# Patient Record
Sex: MALE | Race: BLACK OR AFRICAN AMERICAN | URBAN - METROPOLITAN AREA
[De-identification: names, ages, dates, MRNs, and addresses within clinical notes are randomized per-mention and may not be internally consistent; named-entity substitution may affect disease eponyms.]

---

## 2018-10-10 ENCOUNTER — HISTORICAL (OUTPATIENT)
Dept: INTERNAL MEDICINE | Facility: CLINIC | Age: 23
End: 2018-10-10

## 2018-10-10 LAB
ABS NEUT (OLG): 7.89 X10(3)/MCL (ref 2.1–9.2)
ALBUMIN SERPL-MCNC: 3.6 GM/DL (ref 3.4–5)
ALBUMIN/GLOB SERPL: 1 RATIO (ref 1–2)
ALP SERPL-CCNC: 69 UNIT/L (ref 45–117)
ALT SERPL-CCNC: 40 UNIT/L (ref 12–78)
AMYLASE SERPL-CCNC: 69 UNIT/L (ref 25–115)
APPEARANCE, UA: CLEAR
AST SERPL-CCNC: 23 UNIT/L (ref 15–37)
BACTERIA #/AREA URNS AUTO: ABNORMAL /[HPF]
BASOPHILS # BLD AUTO: 0.09 X10(3)/MCL
BASOPHILS NFR BLD AUTO: 1 %
BASOPHILS NFR BLD MANUAL: 0 %
BILIRUB SERPL-MCNC: 0.7 MG/DL (ref 0.2–1)
BILIRUB UR QL STRIP: NEGATIVE
BILIRUBIN DIRECT+TOT PNL SERPL-MCNC: 0.2 MG/DL
BILIRUBIN DIRECT+TOT PNL SERPL-MCNC: 0.5 MG/DL
BUN SERPL-MCNC: 12 MG/DL (ref 7–18)
CALCIUM SERPL-MCNC: 7.9 MG/DL (ref 8.5–10.1)
CHLORIDE SERPL-SCNC: 108 MMOL/L (ref 98–107)
CO2 SERPL-SCNC: 30 MMOL/L (ref 21–32)
COLOR UR: NORMAL
CREAT SERPL-MCNC: 1 MG/DL (ref 0.6–1.3)
D DIMER PPP IA.FEU-MCNC: 0.41 MCG/ML FEU
EOSINOPHIL # BLD AUTO: 1.36 X10(3)/MCL
EOSINOPHIL NFR BLD AUTO: 11 %
EOSINOPHIL NFR BLD MANUAL: 0 %
ERYTHROCYTE [DISTWIDTH] IN BLOOD BY AUTOMATED COUNT: 13 % (ref 11.5–14.5)
ERYTHROCYTE [DISTWIDTH] IN BLOOD BY AUTOMATED COUNT: 13.2 % (ref 11.5–14.5)
FLUAV AG NPH QL IA: NEGATIVE
FLUBV AG NPH QL IA: NEGATIVE
GLOBULIN SER-MCNC: 3.8 GM/ML (ref 2.3–3.5)
GLUCOSE (UA): NORMAL
GLUCOSE SERPL-MCNC: 83 MG/DL (ref 74–106)
GRANULOCYTES NFR BLD MANUAL: 86 % (ref 43–75)
HCO3 UR-SCNC: 19.8 MMOL/L (ref 22–26)
HCT VFR BLD AUTO: 42.2 % (ref 40–51)
HCT VFR BLD AUTO: 48.8 % (ref 40–51)
HGB BLD-MCNC: 13.9 GM/DL (ref 13.5–17.5)
HGB BLD-MCNC: 16.3 GM/DL (ref 13.5–17.5)
HGB UR QL STRIP: NEGATIVE
HYALINE CASTS #/AREA URNS LPF: ABNORMAL /[LPF]
IMM GRANULOCYTES # BLD AUTO: 0.03 10*3/UL
IMM GRANULOCYTES NFR BLD AUTO: 0 %
KETONES UR QL STRIP: NEGATIVE
LEUKOCYTE ESTERASE UR QL STRIP: NEGATIVE
LIPASE SERPL-CCNC: 92 UNIT/L (ref 73–393)
LYMPHOCYTES # BLD AUTO: 1.73 X10(3)/MCL
LYMPHOCYTES NFR BLD AUTO: 14 % (ref 13–40)
LYMPHOCYTES NFR BLD MANUAL: 6 % (ref 20.5–51.1)
MAGNESIUM SERPL-MCNC: 1.8 MG/DL (ref 1.8–2.4)
MCH RBC QN AUTO: 32.3 PG (ref 26–34)
MCH RBC QN AUTO: 32.5 PG (ref 26–34)
MCHC RBC AUTO-ENTMCNC: 32.9 GM/DL (ref 31–37)
MCHC RBC AUTO-ENTMCNC: 33.4 GM/DL (ref 31–37)
MCV RBC AUTO: 97.2 FL (ref 80–100)
MCV RBC AUTO: 98.1 FL (ref 80–100)
MONOCYTES # BLD AUTO: 1.15 X10(3)/MCL
MONOCYTES NFR BLD AUTO: 9 % (ref 4–12)
MONOCYTES NFR BLD MANUAL: 0 % (ref 2–9)
NEUTROPHILS # BLD AUTO: 7.89 X10(3)/MCL
NEUTROPHILS NFR BLD AUTO: 64 X10(3)/MCL
NEUTS BAND NFR BLD MANUAL: 8 % (ref 0–10)
NITRITE UR QL STRIP: NEGATIVE
O2 HGB ARTERIAL: 92.2 % (ref 94–100)
PCO2 BLDA: 35 MMHG (ref 35–45)
PH SMN: 7.36 [PH] (ref 7.35–7.45)
PH UR STRIP: 6.5 [PH] (ref 4.5–8)
PLATELET # BLD AUTO: 146 X10(3)/MCL (ref 130–400)
PLATELET # BLD AUTO: 158 X10(3)/MCL (ref 130–400)
PLATELET # BLD EST: ADEQUATE 10*3/UL
PMV BLD AUTO: 11.8 FL (ref 7.4–10.4)
PMV BLD AUTO: 12.2 FL (ref 7.4–10.4)
PO2 BLDA: 67 MMHG (ref 75–100)
POC ALLENS TEST: POSITIVE
POC BE: -4.9 (ref -2–2)
POC CO HGB: 2.2 % (ref 0.5–1.5)
POC CO2: 20.9 MMOL/L (ref 22–27)
POC MET HGB: 1.2 % (ref 0–1.5)
POC SAMPLESOURCE: ABNORMAL
POC SATURATED O2: 95.5 % (ref 95–98)
POC SITE: ABNORMAL
POC THB: 14.2 GM/DL (ref 12–18)
POC TREATMENT: ABNORMAL
POTASSIUM SERPL-SCNC: 2.9 MMOL/L (ref 3.5–5.1)
POTASSIUM SERPL-SCNC: 4 MMOL/L (ref 3.5–5.1)
PROT SERPL-MCNC: 7.4 GM/DL (ref 6.4–8.2)
PROT UR QL STRIP: NEGATIVE
RBC # BLD AUTO: 4.3 X10(6)/MCL (ref 4.5–5.9)
RBC # BLD AUTO: 5.02 X10(6)/MCL (ref 4.5–5.9)
RBC #/AREA URNS AUTO: ABNORMAL /[HPF]
RBC MORPH BLD: NORMAL
SODIUM SERPL-SCNC: 144 MMOL/L (ref 136–145)
SP GR UR STRIP: 1.01 (ref 1–1.03)
SQUAMOUS #/AREA URNS LPF: <1 /[LPF]
UROBILINOGEN UR STRIP-ACNC: NORMAL
WBC # SPEC AUTO: 12.2 X10(3)/MCL (ref 4.5–11)
WBC # SPEC AUTO: 14.9 X10(3)/MCL (ref 4.5–11)
WBC #/AREA URNS AUTO: ABNORMAL /HPF

## 2018-10-11 LAB
ABS NEUT (OLG): 14.58 X10(3)/MCL (ref 2.1–9.2)
BASOPHILS # BLD AUTO: 0.03 X10(3)/MCL
BASOPHILS NFR BLD AUTO: 0 %
BUN SERPL-MCNC: 10 MG/DL (ref 7–18)
CALCIUM SERPL-MCNC: 8.5 MG/DL (ref 8.5–10.1)
CHLORIDE SERPL-SCNC: 110 MMOL/L (ref 98–107)
CO2 SERPL-SCNC: 25 MMOL/L (ref 21–32)
CREAT SERPL-MCNC: 0.9 MG/DL (ref 0.6–1.3)
CREAT/UREA NIT SERPL: 11
EOSINOPHIL # BLD AUTO: 0.01 X10(3)/MCL
EOSINOPHIL NFR BLD AUTO: 0 %
ERYTHROCYTE [DISTWIDTH] IN BLOOD BY AUTOMATED COUNT: 13.4 % (ref 11.5–14.5)
GLUCOSE SERPL-MCNC: 105 MG/DL (ref 74–106)
HCT VFR BLD AUTO: 42.9 % (ref 40–51)
HGB BLD-MCNC: 13.9 GM/DL (ref 13.5–17.5)
IMM GRANULOCYTES # BLD AUTO: 0.15 10*3/UL
IMM GRANULOCYTES NFR BLD AUTO: 1 %
LYMPHOCYTES # BLD AUTO: 1.2 X10(3)/MCL
LYMPHOCYTES NFR BLD AUTO: 7 % (ref 13–40)
MCH RBC QN AUTO: 32 PG (ref 26–34)
MCHC RBC AUTO-ENTMCNC: 32.4 GM/DL (ref 31–37)
MCV RBC AUTO: 98.8 FL (ref 80–100)
MONOCYTES # BLD AUTO: 1.14 X10(3)/MCL
MONOCYTES NFR BLD AUTO: 7 % (ref 4–12)
NEUTROPHILS # BLD AUTO: 14.58 X10(3)/MCL
NEUTROPHILS NFR BLD AUTO: 85 X10(3)/MCL
PLATELET # BLD AUTO: 155 X10(3)/MCL (ref 130–400)
PMV BLD AUTO: 12.3 FL (ref 7.4–10.4)
POTASSIUM SERPL-SCNC: 4.3 MMOL/L (ref 3.5–5.1)
RBC # BLD AUTO: 4.34 X10(6)/MCL (ref 4.5–5.9)
SODIUM SERPL-SCNC: 144 MMOL/L (ref 136–145)
WBC # SPEC AUTO: 17.1 X10(3)/MCL (ref 4.5–11)

## 2018-10-12 LAB
FINAL CULTURE: NORMAL
RAPID GROUP A STREP (OHS): NORMAL

## 2019-02-08 ENCOUNTER — HISTORICAL (OUTPATIENT)
Dept: RESPIRATORY THERAPY | Facility: HOSPITAL | Age: 24
End: 2019-02-08

## 2022-04-11 ENCOUNTER — HISTORICAL (OUTPATIENT)
Dept: ADMINISTRATIVE | Facility: HOSPITAL | Age: 27
End: 2022-04-11

## 2022-04-27 VITALS
SYSTOLIC BLOOD PRESSURE: 90 MMHG | DIASTOLIC BLOOD PRESSURE: 59 MMHG | BODY MASS INDEX: 22.8 KG/M2 | WEIGHT: 123.88 LBS | HEIGHT: 62 IN

## 2022-05-05 NOTE — HISTORICAL [facility]ER
This is a historical note converted from Chen. Formatting and pictures may have been removed.  Please reference Chen for original formatting and attached multimedia. INTERNAL MEDICINE DISCHARGE SUMMARY  ?  ?  Patient: ?Elier Fields  Date of Birth:?1995  MRN:?[id number]  ?   Admit Date:10/10/2018  Discharge Date:10/11/2018  ??  ?  Attending Physician:?Raj BROTHERS, Marium MCGILL?  ?  Primary Care Physician:?  ?????No PCP, No  ??????  ?  Referring Physician:??Jose Granger MD  ?  Consulting Physician(s):?None  ?  ?  Condition on Discharge:?Clinically and hemodynamically stable.  ?  Final Diagnosis:  Unspecified asthma with (acute) exacerbation (J45.901)?  ??  ?  Procedures:?  ?   ?  ?  History of Present Illness:??  Brief HPI:  Patient is a 23-year-old gentleman with past medical history significant for polysubstance abuse and childhood asthma who presented to the ED today with complaints of shortness of breath, nausea and vomiting. ?Patient reports that the onset was yesterday evening, he states that everybody at the [facility] house he lives in has been getting some sort of??viral sickness.???He states that he has had 3 episodes of vomit so far and felt really nauseous last night though both symptoms were improved by the time of evaluation. ?He denies bloody or bilious product. ?He also reports a sore throat as well as shortness of breath and wheezing. ?Denies any diarrhea, fever/chills, diaphoresis, palpitations, chest pain, abdominal pain, headache.  In the ED, patient was found to be tachycardic with a heart rate ranging from 120-150. ?He was afebrile and intermittently tachypneic. ?Lab work was significant for hypokalemia, 2.9. ?ABG was done with patient on 2 L and showed O2 of 67. He was given 3L NS bolus and given 4 duoneb treatments. Tachycardia did not resolve, medicine was then consulted for further management of a suspected asthma exacerbation.  ?  ?  ?  Physical Exam:??  Temperature????????36.8  ???(08:00)  Systolic Blood Pressure????????119 ???(08:00)  Diastolic Blood Pressure????????79 ???(08:00)  Pulse????????113 ???(08:00)  SpO2????????96 ???(08:00)  Respiratory Rate????????24 ???(08:00)  Gen: well-nourished, well-developed?24yo AAM?in no acute distress  HEENT: Normocephalic, atraumatic.  Neck: No JVD or carotid bruits.  Heart: RRR, no murmurs, gallops, clicks or rubs.  Lungs: Mild wheezes on inspiration. Normal work of breathing. Chest rise symmetrical on inspiration.  Abd: Soft, non-tender, non-distended and without guarding. No organomegaly. No obvious masses. Bowel sounds present.  Extremities: Radial and pedal pulses 2+ bilaterally, no LE edema.  MSK: No obvious deformities. Moves all extremities purposefully.  Neuro: Responds well to commands.  Skin: Warm, dry and without rashes.  ?   Laboratory Data:??  Labs Last 48 hours?  ?Chemistry? Hematology/Coagulation? Blood Gases?   Sodium Lvl: 144 mmol/L (10/11/18 03:50:00 CDT) D-Dimer: 0.41 mcg/ml FEU (10/10/18 11:03:00 CDT) Sample ABG: arterial (10/10/18 13:36:00 CDT)   Sodium Lvl: 144 mmol/L (10/10/18 11:03:00 CDT) WBC:?17.1 x10(3)/mcL?High (10/11/18 03:50:00 CDT) Treatment: nc 2 lpm (10/10/18 13:36:00 CDT)   Potassium Lvl: 4.3 mmol/L (10/11/18 03:50:00 CDT) WBC:?14.9 x10(3)/mcL?High (10/10/18 18:25:00 CDT) Site: Radial Lt (10/10/18 13:36:00 CDT)   Potassium Lvl: 4 mmol/L (10/10/18 22:00:54 CDT) RBC:?4.34 x10(6)/mcL?Low (10/11/18 03:50:00 CDT) pH Art: 7.36 (10/10/18 13:36:00 CDT)   Chloride:?110 mmol/L?High (10/11/18 03:50:00 CDT) RBC:?4.3 x10(6)/mcL?Low (10/10/18 18:25:00 CDT) pCO2 Art: 35 mmHg (10/10/18 13:36:00 CDT)   Chloride:?108 mmol/L?High (10/10/18 11:03:00 CDT) Hgb: 13.9 gm/dL (10/11/18 03:50:00 CDT) pO2 Art:?67 mmHg?Low (10/10/18 13:36:00 CDT)   CO2: 25 mmol/L (10/11/18 03:50:00 CDT) Hgb: 13.9 gm/dL (10/10/18 18:25:00 CDT) HCO3 Art:?19.8 mmol/L?Low (10/10/18 13:36:00 CDT)   CO2: 30 mmol/L (10/10/18 11:03:00 CDT) Hct: 42.9 % (10/11/18  03:50:00 CDT) CO2 Totl Art:?20.9 mmol/L?Low (10/10/18 13:36:00 CDT)   Calcium Lvl: 8.5 mg/dL (10/11/18 03:50:00 CDT) Hct: 42.2 % (10/10/18 18:25:00 CDT) O2 Sat Art: 95.5 % (10/10/18 13:36:00 CDT)   Calcium Lvl:?7.9 mg/dL?Low (10/10/18 11:03:00 CDT) Platelet: 155 x10(3)/mcL (10/11/18 03:50:00 CDT) D base:?-4.9?Low (10/10/18 13:36:00 CDT)   Magnesium Lvl: 1.8 mg/dL (10/10/18 11:03:00 CDT) Platelet: 146 x10(3)/mcL (10/10/18 18:25:00 CDT) THB AB.2 gm/dL (10/10/18 13:36:00 CDT)   Glucose Lvl: 105 mg/dL (10/11/18 03:50:00 CDT) MCV: 98.8 fL (10/11/18 03:50:00 CDT) CO Hgb:?2.2 %?High (10/10/18 13:36:00 CDT)   Glucose Lvl: 83 mg/dL (10/10/18 11:03:00 CDT) MCV: 98.1 fL (10/10/18 18:25:00 CDT) Met Hgb Art: 1.2 % (10/10/18 13:36:00 CDT)   BUN: 10 mg/dL (10/11/18 03:50:00 CDT) MCH: 32 pg (10/11/18 03:50:00 CDT) O2 Hgb:?92.2 %?Low (10/10/18 13:36:00 CDT)   BUN: 12 mg/dL (10/10/18 11:03:00 CDT) MCH: 32.3 pg (10/10/18 18:25:00 CDT) Allens: Positive (10/10/18 13:36:00 CDT)   Creatinine: 0.9 mg/dL (10/11/18 03:50:00 CDT) MCHC: 32.4 gm/dL (10/11/18 03:50:00 CDT) FIO2 AB (10/10/18 13:36:00 CDT)   Creatinine: 1 mg/dL (10/10/18 11:03:00 CDT) MCHC: 32.9 gm/dL (10/10/18 18:25:00 CDT) FIO2: 21 % (10/10/18 19:46:00 CDT)   BUN/Creat Ratio: 11 (10/11/18 03:50:00 CDT) RDW: 13.4 % (10/11/18 03:50:00 CDT) FIO2: 100 % (10/10/18 12:14:00 CDT)   eGFR-AA: >105 (10/11/18 03:50:00 CDT) RDW: 13.2 % (10/10/18 18:25:00 CDT) ?   eGFR-AA: >105 (10/10/18 11:03:00 CDT) MPV:?12.3 fL?High (10/11/18 03:50:00 CDT) ?   eGFR-MAINE: >105 (10/11/18 03:50:00 CDT) MPV:?11.8 fL?High (10/10/18 18:25:00 CDT) ?   eGFR-MAINE: 97 mL/min (10/10/18 11:03:00 CDT) Abs Neut:?14.58 x10(3)/mcL?High (10/11/18 03:50:00 CDT) ?   Amylase Lvl: 69 unit/L (10/10/18 11:03:00 CDT) Abs Neut: 7.89 x10(3)/mcL (10/10/18 10:05:00 CDT) ?   Bili Total: 0.7 mg/dL (10/10/18 11:03:00 CDT) Neutro Auto: 85 x10(3)/mcL (10/11/18 03:50:00 CDT) ?   Bili Direct: 0.2 mg/dL (10/10/18 11:03:00 CDT)  Neutro Auto: 64 x10(3)/mcL (10/10/18 10:05:00 CDT) ?   Bili Indirect: 0.5 mg/dL (10/10/18 11:03:00 CDT) Lymph Auto:?7 %?Low (10/11/18 03:50:00 CDT) ?   AST: 23 unit/L (10/10/18 11:03:00 CDT) Lymph Auto: 14 % (10/10/18 10:05:00 CDT) ?   ALT: 40 unit/L (10/10/18 11:03:00 CDT) Mono Auto: 7 % (10/11/18 03:50:00 CDT) ?   Alk Phos: 69 unit/L (10/10/18 11:03:00 CDT) Mono Auto: 9 % (10/10/18 10:05:00 CDT) ?   Total Protein: 7.4 gm/dL (10/10/18 11:03:00 CDT) Eos Auto: 0 (10/11/18 03:50:00 CDT) ?   Albumin Lvl: 3.6 gm/dL (10/10/18 11:03:00 CDT) Eos Auto: 11 (10/10/18 10:05:00 CDT) ?   Globulin:?3.8 gm/mL?High (10/10/18 11:03:00 CDT) Abs Eos: 0.01 x10(3)/mcL (10/11/18 03:50:00 CDT) ?   A/G Ratio: 1 ratio (10/10/18 11:03:00 CDT) Abs Eos: 1.36 x10(3)/mcL (10/10/18 10:05:00 CDT) ?   Lipase Lvl: 92 unit/L (10/10/18 11:03:00 CDT) Basophil Auto: 0 (10/11/18 03:50:00 CDT) ?   ? Basophil Auto: 1 (10/10/18 10:05:00 CDT) ?   ? Abs Neutro: 14.58 x10(3)/mcL (10/11/18 03:50:00 CDT) ?   ? Abs Neutro: 7.89 x10(3)/mcL (10/10/18 10:05:00 CDT) ?   ? Abs Lymph: 1.2 x10(3)/mcL (10/11/18 03:50:00 CDT) ?   ? Abs Lymph: 1.73 x10(3)/mcL (10/10/18 10:05:00 CDT) ?   ? Abs Mono: 1.14 x10(3)/mcL (10/11/18 03:50:00 CDT) ?   ? Abs Mono: 1.15 x10(3)/mcL (10/10/18 10:05:00 CDT) ?   ? Abs Baso: 0.03 x10(3)/mcL (10/11/18 03:50:00 CDT) ?   ? Abs Baso: 0.09 x10(3)/mcL (10/10/18 10:05:00 CDT) ?   ? IG%: 1 % (10/11/18 03:50:00 CDT) ?   ? IG%: 0 % (10/10/18 10:05:00 CDT) ?   ? IG#: 0.15 (10/11/18 03:50:00 CDT) ?   ? IG#: 0.03 (10/10/18 10:05:00 CDT) ?   ? Segs Man:?86 %?High (10/10/18 18:25:00 CDT) ?   ? Band Man: 8 % (10/10/18 18:25:00 CDT) ?   ? Lymph Man:?6 %?Low (10/10/18 18:25:00 CDT) ?   ? Monocyte Man:?0 %?Low (10/10/18 18:25:00 CDT) ?   ? Eos Man: 0 % (10/10/18 18:25:00 CDT) ?   ? Basophil Man: 0 % (10/10/18 18:25:00 CDT) ?   ? Platelet Est: Adequate (10/10/18 18:25:00 CDT) ?   ? RBC Morph: Normal (10/10/18 18:25:00 CDT) ?   ?  ?  ?  Hospital  Course:??  Patient was admitted to the medicine service for observation.? Patient was?given duo nebs?and prednisone. ?He was also started on supplemental oxygen.? Rapid strep and flu swab were both negative.? Patient was weaned from oxygen?and was able to maintain sats on room air. ?Patient states he is feeling better today.? Patient was clinically and hemodynamically stable at discharge.  ?  ?  Discharge Medications:??  ?????????? beclomethasone: 1 puff(s), INH, BID, 1 EA, 2 Refill(s).  ???????????Proventil HFA: 2 puffs q6hrs; as needed for wheezing  ?????????? nicotine: TD, Daily, 0 Refill(s).  ?  Discharge Instructions:??  ??????????Diet:?Regular diet  ??????????Activity:?May resume regular activity.  ??????????Disposition:?Clinically and hemodynamically stable.  ?  Follow-Up Appointments:??  Follow-up with primary care physician in 1-2 weeks for post-wards follow-up.  Anytime the conditions worsen, return to clinic or go to ED.  Follow-up with?outpatient PFTs.  ?  Code Status:?FULL CODE?  ?  Navya?Ambrocio, DO  PGY-1  Pager: [phone]  ?  ?  ?   I was present with the Resident during discharge day management.  ???  x[ ] I discussed the case with the Resident and agree with the discharge plan as above.  [ ] I discussed the case with the Resident and agree with the discharge plan as above except:  ???  Time spent on discharge 35[ ] minutes

## 2022-05-05 NOTE — HISTORICAL [facility]
This is a historical note converted from Cercres[employer]. Formatting and pictures may have been removed.  Please reference Cerner for original formatting and attached multimedia. Chief Complaint  PT SENT FROM [facility] W CO ABD PAIN W NV AND SOB SINCE LAST PM. RETRACTIONS NOTED.  History of Present Illness  Patient is a 23-year-old gentleman with past medical history significant for polysubstance abuse and childhood asthma?who presented to the ED today?with?complaints of shortness of breath, nausea?and vomiting. ?Patient reports that the onset was yesterday evening, he states that everybody?at the [facility] [place] he lives in?has been getting some sort of viral sickness. ?He states that he has had 3?episodes of vomit?so far?and felt?really nauseous last night?though both symptoms were improved by the time of?evaluation.? He denies?bloody or bilious?product.? He also reports a sore throat?as well as shortness of breath?and wheezing. ?Denies any diarrhea, fever/chills,?diaphoresis,?palpitations, chest pain, abdominal pain, headache.  In the ED, patient was found to be tachycardic?with a heart rate ranging from 120-150. ?He?was afebrile?and?intermittently tachypneic. ?Lab work was significant for hypokalemia, 2.9.? ABG was done with patient on 2 L?and showed O2 of 67. He was given 3L NS bolus and?given 4 duoneb treatments. Tachycardia did not resolve,?medicine was?then consulted for?further management of a suspected?asthma exacerbation.  ?  Medical history: Patient reports to have had childhood asthma,?denies ever coming to the hospital for an exacerbation.? Last known?flare was?when he was 10 or 11 years old. ?Currently is not using any inhalers at?home.? 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 Currently does not use any home meds.  ?  Social history: Patient currently lives at [facility] where he is?essentially going through?rehab treatment for  previous polysubstance abuse. ?He has been sober for about 2 months.? He recently got released from longterm in May of the?year for distributing?synthetic marijuana. ?Currently smokes?about 1 pack/day, started at age 12.? Denies drinking any alcohol.? Denies any current illicit drug use.  Review of Systems  CONSTITUTIONAL: No weight loss, fever, chills, weakness or fatigue.  HEENT:?No visual loss, blurred vision, double vision or yellow sclerae.?No hearing loss, sneezing, congestion, runny nose. +?sore throat.  SKIN: No rash or itching.  CARDIOVASCULAR: No chest pain, chest pressure or chest discomfort. No palpitations or edema.  RESPIRATORY: No cough or sputum. +SOB  GASTROINTESTINAL: No anorexia or diarrhea. No abdominal pain or blood. +nausea and vomiting, but not during admission  GENITOURINARY: No burning on urination.  NEUROLOGICAL: No headache, dizziness, syncope, paralysis, ataxia, numbness or tingling in the extremities. No change in bowel or bladder control.  MUSCULOSKELETAL: No muscle, back pain, joint pain or stiffness.  HEMATOLOGIC: No anemia, bleeding or bruising.  LYMPHATICS: No enlarged nodes.  ENDOCRINOLOGIC: No reports of sweating, cold or heat intolerance. No polyuria or polydipsia.  Physical Exam  Vitals & Measurements  T:?36.5? ?C (Oral)? TMIN:?36.5? ?C (Oral)? TMAX:?36.9? ?C (Oral)? HR:?126(Peripheral)? RR:?22? BP:?111/82? SpO2:?96%? WT:?52?kg? WT:?52?kg?  General: No acute distress. Breathing well on 2L. After removing NC, patient sats 95%. Mildly diaphoretic.  Eye: Extraocular movements are intact, Normal conjunctiva. PERRLA  HENT: Normal hearing, Oral mucosa is moist, no pharyngeal erythema. Left ear is deformed.  Neck: Supple, Non-tender, No jugular venous distention  Respiratory: Clear lung sounds on the R side, minimal wheezing heard in the L fields, Respirations are non-labored, Breath sounds are equal, Symmetrical chest wall expansion.  Cardiovascular: Tachycardic 130s. Regular rhythm,  normal S1, S2, No murmur appreciated, Normal peripheral perfusion, no pedal edema  Gastrointestinal: Soft, Non-tender, bowel sounds present  Musculoskeletal: No tenderness, no swelling  Integumentary: Warm, Moist, No pallor,  Neurologic: Alert, Oriented, No gross focal deficits, Cranial Nerves II-XII are grossly intact.  Cognition and Speech: Oriented, Speech clear and coherent, Functional cognition intact.  Psychiatric: Cooperative, Appropriate mood & affect  Assessment/Plan  1. ?Hypoxia  -Etiology is likely?2/2?viral upper respiratory?illness, ?possible asthma exacerbation  -Does not have home inhalers  -Patient is exposed to sick contacts at home  -Ordered flu swab  -Duonebs PRN  -O2 supplement as needed  ?  2. ?Hypokalemia  -Admission K 2.9  -No acute changes on EKG  -Ordered 60mEq oral KCl  -Will monitor BMPs  ?  3. Tobacco abuse  -Patient encouraged to cease smoking  -Nicotine patch ordered  ?  ?  CODE STATUS: FULL CODE  Access: PIV  Antibiotics: None  Diet: Regular  DVT Prophylaxis: Lovenox  GI Prophylaxis: None  ?  Disposition: Admit to telemetry. Monitor HR and BMP.   Problem List/Past Medical History  Ongoing  No qualifying data  Historical  No qualifying data  Procedure/Surgical History  LT EAR   Medications  Inpatient  Lovenox, 40 mg= 0.4 mL, Subcutaneous, Daily  Home  No active home medications  Allergies  penicillins?(RASH)  Social History  Alcohol  Past, 10/10/2018  Substance Abuse  Past, Marijuana, Methamphetamines, Prescription medications, 10/10/2018  Tobacco  Current every day smoker, No, 20 per day. 11 year(s). Total pack years: 11. Started age 12 Years. Ready to change: No. Household tobacco concerns: No., 10/10/2018  Family History  Non contributory  Lab Results  Chemistry? Hematology/Coagulation? Blood Gases?   Sodium Lvl: 144 mmol/L (10/10/18 11:03:00 CDT) D-Dimer: 0.41 mcg/ml FEU (10/10/18 11:03:00 CDT) Sample ABG: arterial (10/10/18 13:36:00 CDT)   Potassium Lvl:?2.9 mmol/L?Low (10/10/18  11:03:00 CDT) WBC:?12.2 x10(3)/mcL?High (10/10/18 10:05:00 CDT) Treatment: nc 2 lpm (10/10/18 13:36:00 CDT)   Chloride:?108 mmol/L?High (10/10/18 11:03:00 CDT) RBC: 5.02 x10(6)/mcL (10/10/18 10:05:00 CDT) Site: Radial Lt (10/10/18 13:36:00 CDT)   CO2: 30 mmol/L (10/10/18 11:03:00 CDT) Hgb: 16.3 gm/dL (10/10/18 10:05:00 CDT) pH Art: 7.36 (10/10/18 13:36:00 CDT)   Calcium Lvl:?7.9 mg/dL?Low (10/10/18 11:03:00 CDT) Hct: 48.8 % (10/10/18 10:05:00 CDT) pCO2 Art: 35 mmHg (10/10/18 13:36:00 CDT)   Magnesium Lvl: 1.8 mg/dL (10/10/18 11:03:00 CDT) Platelet: 158 x10(3)/mcL (10/10/18 10:05:00 CDT) pO2 Art:?67 mmHg?Low (10/10/18 13:36:00 CDT)   Glucose Lvl: 83 mg/dL (10/10/18 11:03:00 CDT) MCV: 97.2 fL (10/10/18 10:05:00 CDT) HCO3 Art:?19.8 mmol/L?Low (10/10/18 13:36:00 CDT)   BUN: 12 mg/dL (10/10/18 11:03:00 CDT) MCH: 32.5 pg (10/10/18 10:05:00 CDT) CO2 Totl Art:?20.9 mmol/L?Low (10/10/18 13:36:00 CDT)   Creatinine: 1 mg/dL (10/10/18 11:03:00 CDT) MCHC: 33.4 gm/dL (10/10/18 10:05:00 CDT) O2 Sat Art: 95.5 % (10/10/18 13:36:00 CDT)   eGFR-AA: >105 (10/10/18 11:03:00 CDT) RDW: 13 % (10/10/18 10:05:00 CDT) D base:?-4.9?Low (10/10/18 13:36:00 CDT)   eGFR-MAINE: 97 mL/min (10/10/18 11:03:00 CDT) MPV:?12.2 fL?High (10/10/18 10:05:00 CDT) THB AB.2 gm/dL (10/10/18 13:36:00 CDT)   Amylase Lvl: 69 unit/L (10/10/18 11:03:00 CDT) Abs Neut: 7.89 x10(3)/mcL (10/10/18 10:05:00 CDT) CO Hgb:?2.2 %?High (10/10/18 13:36:00 CDT)   Bili Total: 0.7 mg/dL (10/10/18 11:03:00 CDT) Neutro Auto: 64 x10(3)/mcL (10/10/18 10:05:00 CDT) Met Hgb Art: 1.2 % (10/10/18 13:36:00 CDT)   Bili Direct: 0.2 mg/dL (10/10/18 11:03:00 CDT) Lymph Auto: 14 % (10/10/18 10:05:00 CDT) O2 Hgb:?92.2 %?Low (10/10/18 13:36:00 CDT)   Bili Indirect: 0.5 mg/dL (10/10/18 11:03:00 CDT) Mono Auto: 9 % (10/10/18 10:05:00 CDT) Allens: Positive (10/10/18 13:36:00 CDT)   AST: 23 unit/L (10/10/18 11:03:00 CDT) Eos Auto: 11 (10/10/18 10:05:00 CDT) FIO2 AB (10/10/18 13:36:00 CDT)    ALT: 40 unit/L (10/10/18 11:03:00 CDT) Abs Eos: 1.36 x10(3)/mcL (10/10/18 10:05:00 CDT)    Alk Phos: 69 unit/L (10/10/18 11:03:00 CDT) Basophil Auto: 1 (10/10/18 10:05:00 CDT)    Total Protein: 7.4 gm/dL (10/10/18 11:03:00 CDT) Abs Neutro: 7.89 x10(3)/mcL (10/10/18 10:05:00 CDT)    Albumin Lvl: 3.6 gm/dL (10/10/18 11:03:00 CDT) Abs Lymph: 1.73 x10(3)/mcL (10/10/18 10:05:00 CDT)    Globulin:?3.8 gm/mL?High (10/10/18 11:03:00 CDT) Abs Mono: 1.15 x10(3)/mcL (10/10/18 10:05:00 CDT)    A/G Ratio: 1 ratio (10/10/18 11:03:00 CDT) Abs Baso: 0.09 x10(3)/mcL (10/10/18 10:05:00 CDT)    Lipase Lvl: 92 unit/L (10/10/18 11:03:00 CDT) IG%: 0 % (10/10/18 10:05:00 CDT)     IG#: 0.03 (10/10/18 10:05:00 CDT)    Diagnostic Results  Accession:?[id number]  Order:?XR Chest 2 Views  Report Dt/Tm:?10/10/2018 10:41  IMPRESSION: No active pulmonary disease      Pt was given IV Solumedrol in the ED, will start oral Prednisone tomorrow   I was present with the resident during the history and physical examination.  ???  [ x] I discussed the case with the resident and agree with the findings and plan as documented in the residents note.  [ ] I discussed the case with the resident and agree with the findings and plan as documented in the residents note except:  Hx of asthma as a child but not requiring any inhalors or evaluation for shortness of breath/cough for 13 years  Patient lives in a half way house with other people who he says are having flu or respiratory issues  Patient presented with sore throat, n/v, shortness of breath and had expiratory wheezes on examination  Asthma exacerbation vs reactive airway disease with?likely viral etiology  Will check for flu and strep  ?   PGY-2 Addendum:  Mr. Fields is a 23-year-old -American man with PMH of asthma and synthetic marijuana use and abuse who presented to the [facility] ED complaining of shortness of breath starting last night. ?He stated that he was in his usual respiratory state and  has no functional limitations yesterday morning, but started experiencing shortness of breath last night with no apparent triggers. ?Admits throat pain. ?Denies chest pain, abdominal pain, or lower extremity swelling or pain. ?He has a history of asthma, is not on medication, and has not had any asthma related breathing issues since he was in his teen years. ?He does have a smoking history of about 11 years, and is continuing to smoke about 1 pack per day of cigarettes. ?He has an incarceration history starting last year and was released in May 2018. ?He also was just in rehab for abuse of synthetic marijuana and was released on 9/25/2018. ?In the ED, he was found to have K 2.9, WBC 12.2, and ABG 7.36/35/67. ?CXR appear to indicate some perihilar fullness. ?Internal medicine was consulted for acute asthma exacerbation. ?He was initially given multiple DuoNeb treatments and Solu-Medrol 125 mg. ?He was also given Cardizem 15 mg. ?On exam, patient was breathing on room air with SpO2 95%, but tachycardic with . ?Patient denied any discomforting symptoms at the time. ?On physical exam, patient was in sinus rhythm, but tachycardic with . ?Lung exam was clear of wheezes, rhonchi, or rales. ?After recommending to patient that he should be admitted for observation, patient refused and expressed that he wanted to sign out AMA. ?However, after talking to his senior care house , he decided to be admitted.?  ?  A/P:  Acute hypoxemic respiratory failure  Upper respiratory tract infection vs. developing pneumonia  Sinus tachycardia  Hypokalemia  Leukocytosis  History of asthma - not on medication, no PFTs on file  ?  The patient was admitted to Telemetry for observation. ?Will have DuoNeb PRN and continue steroids starting tomorrow. ?D-dimer was 0.41, but breathing comfortably on room air with SpO2 93-97%, no wheezing or abnormal lung sounds. ?PERC score of 2 (tachycardia and SpO2<95). ?A-a gradient of 88.9  mmHg. ?Will get CT thorax to rule out PE. ?Getting flu swab and rapid strep. ?Complaint of sore throat with leukocytosis?and elevated segmented cells even after dilution with IV fluids.? Will start Levaquin for potential upper respiratory tract infection or developing pneumonia.? Sinus tachycardia may be due to albuterol use. ?Patient is asymptomatic. ?Wiill monitor for now. ?Leukocytosis is likely due to hemoconcentration. ?Will follow up CBC. ?Potassium was repleted. ?Lovenox for DVT prophylaxis.

## 2025-02-17 ENCOUNTER — HOSPITAL ENCOUNTER (EMERGENCY)
Facility: HOSPITAL | Age: 30
Discharge: HOME | End: 2025-02-17
Attending: EMERGENCY MEDICINE
Payer: MEDICAID

## 2025-02-17 VITALS
RESPIRATION RATE: 16 BRPM | WEIGHT: 110 LBS | TEMPERATURE: 99 F | OXYGEN SATURATION: 100 % | HEIGHT: 63 IN | HEART RATE: 68 BPM | DIASTOLIC BLOOD PRESSURE: 83 MMHG | SYSTOLIC BLOOD PRESSURE: 128 MMHG | BODY MASS INDEX: 19.49 KG/M2

## 2025-02-17 DIAGNOSIS — H66.90 OTITIS MEDIA, UNSPECIFIED LATERALITY, UNSPECIFIED OTITIS MEDIA TYPE: ICD-10-CM

## 2025-02-17 DIAGNOSIS — H92.03 OTALGIA OF BOTH EARS: Primary | ICD-10-CM

## 2025-02-17 DIAGNOSIS — H61.21 IMPACTED CERUMEN OF RIGHT EAR: ICD-10-CM

## 2025-02-17 PROCEDURE — 99284 EMERGENCY DEPT VISIT MOD MDM: CPT

## 2025-02-17 RX ORDER — CIPROFLOXACIN AND DEXAMETHASONE 3; 1 MG/ML; MG/ML
4 SUSPENSION/ DROPS AURICULAR (OTIC) 2 TIMES DAILY
Qty: 7.5 ML | Refills: 0 | Status: SHIPPED | OUTPATIENT
Start: 2025-02-17

## 2025-02-17 RX ORDER — CEFDINIR 300 MG/1
300 CAPSULE ORAL 2 TIMES DAILY
Qty: 20 CAPSULE | Refills: 0 | Status: SHIPPED | OUTPATIENT
Start: 2025-02-17 | End: 2025-02-27

## 2025-02-17 NOTE — ED PROVIDER NOTES
Encounter Date: 2/17/2025       History     Chief Complaint   Patient presents with    Ear Fullness     See [facility] for details.      The history is provided by the patient. No  was used.     Review of patient's allergies indicates:   Allergen Reactions    Penicillins Rash     History reviewed. No pertinent past medical history.  History reviewed. No pertinent surgical history.  No family history on file.  Social History[1]  Review of Systems   Constitutional: Negative.  Negative for activity change, appetite change, diaphoresis, fatigue and fever.   HENT:  Positive for hearing loss. Negative for rhinorrhea and sinus pressure.    Eyes: Negative.    Respiratory: Negative.  Negative for chest tightness.    Cardiovascular:  Negative for chest pain.   Gastrointestinal: Negative.  Negative for abdominal distention and abdominal pain.   Endocrine: Negative.    Genitourinary: Negative.    Musculoskeletal: Negative.  Negative for arthralgias.   Allergic/Immunologic: Negative.    Neurological:  Negative for dizziness and headaches.   Hematological: Negative.    Psychiatric/Behavioral: Negative.         Physical Exam     Initial Vitals [02/17/25 1408]   BP Pulse Resp Temp SpO2   128/83 68 16 98.6 °F (37 °C) 100 %      MAP       --         Physical Exam    Nursing note and vitals reviewed.  Constitutional: He appears well-developed and well-nourished. He is cooperative. No distress.   HENT:   Head: Normocephalic and atraumatic. Not macrocephalic.   Right Ear: External ear normal. Tympanic membrane is not erythematous. A middle ear effusion is present. Decreased hearing is noted.   Left Ear: Tympanic membrane is not erythematous.   Nose: No mucosal edema. Right sinus exhibits no frontal sinus tenderness. Left sinus exhibits no frontal sinus tenderness. Mouth/Throat: Oropharynx is clear and moist and mucous membranes are normal. No oropharyngeal exudate.   Cerumen impaction right ear noted. Atraumatic deformity  to left ear canal.    Eyes: Conjunctivae and EOM are normal. Pupils are equal, round, and reactive to light. Right eye exhibits no discharge. Left eye exhibits no discharge.   Neck: Neck supple. No tracheal deviation present.   Normal range of motion.  Cardiovascular:  Normal rate and regular rhythm.           Pulmonary/Chest: Effort normal. No respiratory distress. He has no decreased breath sounds.   Musculoskeletal:         General: Normal range of motion.      Cervical back: Normal range of motion and neck supple.     Lymphadenopathy:        Head (right side): No submental adenopathy present.        Head (left side): No submental adenopathy present.     He has no cervical adenopathy.   Neurological: He is alert and oriented to person, place, and time. He has normal strength. No cranial nerve deficit. GCS score is 15. GCS eye subscore is 4. GCS verbal subscore is 5. GCS motor subscore is 6.   Skin: Skin is warm.   Psychiatric: He has a normal mood and affect. His behavior is normal. Judgment and thought content normal.         ED Course   Procedures  Labs Reviewed - No data to display       Imaging Results    None          Medications - No data to display  Medical Decision Making  29yoAAM w/hx of multiple left ear surgeries presents to the emergency department with right ear fullness over the last week or 2.  Patient states he placed some drops but was unable to get any wax out.  Patient denies nausea, vomiting, fever, chills, chest pain, or difficulty breathing.  Patient denies any drainage from the ear.    Problems Addressed:  Impacted cerumen of right ear: acute illness or injury     Details: Differential diagnosis included but not limited to:  Cerumen impaction, otitis media, otitis externa     Patient does appear to have some cerumen impaction.  Also has otitis media just behind the cerumen impaction which is noted on exam.  No signs of otitis externa.  I did prescribe some Ciprodex drops however because  there has been a lot of manipulation of the ear canal.  Patient will follow up with his primary care in ear nose and throat doctor as needed. All questions asked and answered at the time of the visit. Strict ER precautions given. Patient and family members agreeable to plan.     Otalgia of both ears:     Details: Attempted to flush the right ear and was able to remove some cerumen with curette.  No wax left falling irrigation.  Patient TM intact.  Otitis media, unspecified laterality, unspecified otitis media type:     Details: Differential diagnosis included but not limited to:  Cerumen impaction, otitis media, otitis externa     Patient does appear to have some cerumen impaction.  Also has otitis media just behind the cerumen impaction which is noted on exam.  No signs of otitis externa.  I did prescribe some Ciprodex drops however because there has been a lot of manipulation of the ear canal.  Patient will follow up with his primary care in ear nose and throat doctor as needed. All questions asked and answered at the time of the visit. Strict ER precautions given. Patient and family members agreeable to plan.       Risk  Prescription drug management.                                      Clinical Impression:  Final diagnoses:  [H92.03] Otalgia of both ears (Primary)  [H61.21] Impacted cerumen of right ear  [H66.90] Otitis media, unspecified laterality, unspecified otitis media type          ED Disposition Condition    Discharge Stable          ED Prescriptions       Medication Sig Dispense Start Date End Date Auth. Provider    ciprofloxacin-dexAMETHasone 0.3-0.1% (CIPRODEX) 0.3-0.1 % DrpS Place 4 drops into both ears 2 (two) times daily. 7.5 mL 2/17/2025 -- Ana Varghese PA    cefdinir (OMNICEF) 300 MG capsule Take 1 capsule (300 mg total) by mouth 2 (two) times daily. for 10 days 20 capsule 2/17/2025 2/27/2025 Ana Varghese PA          Follow-up Information       Follow up With Specialties Details  Why Contact Info    [facility] - Emergency Dept Emergency Medicine  As needed, If symptoms worsen [id number]  [facility] [id number] [phone]        This note was typed partially using voice recognition software.  Please be reminded that not all corrections/addendums to grammar may have been made prior to closing of this chart.         [1]         Ana Varghese PA  02/17/25 3573